# Patient Record
Sex: FEMALE | Race: WHITE | NOT HISPANIC OR LATINO | URBAN - METROPOLITAN AREA
[De-identification: names, ages, dates, MRNs, and addresses within clinical notes are randomized per-mention and may not be internally consistent; named-entity substitution may affect disease eponyms.]

---

## 2023-07-31 ENCOUNTER — EMERGENCY (EMERGENCY)
Facility: HOSPITAL | Age: 28
LOS: 1 days | Discharge: HOME | End: 2023-07-31
Admitting: EMERGENCY MEDICINE
Payer: COMMERCIAL

## 2023-07-31 VITALS
HEIGHT: 69 IN | WEIGHT: 138.01 LBS | SYSTOLIC BLOOD PRESSURE: 124 MMHG | RESPIRATION RATE: 16 BRPM | DIASTOLIC BLOOD PRESSURE: 87 MMHG | TEMPERATURE: 98 F | OXYGEN SATURATION: 98 % | HEART RATE: 79 BPM

## 2023-07-31 PROCEDURE — 99284 EMERGENCY DEPT VISIT MOD MDM: CPT

## 2023-07-31 NOTE — ED PROVIDER NOTE - OBJECTIVE STATEMENT
For patient with a past medical history or allergies presents with increased swelling over bug bite on right calf.  States that she was [place] 2 days ago when she initially got the bite admits to pruritus.  Denies any fever, chills, sweats.  Patient noticed increased swelling yesterday.  Denies any lymphadenopathy.

## 2023-07-31 NOTE — ED ADULT NURSE NOTE - CHPI ED NUR SYMPTOMS NEG
no abdominal pain/not acting differently/no anxiety/no arthralgias/no blurred vision/no body aches/no chest pain/no chills/no crying/no decreased eating/drinking/no diaphoresis/no difficulty breathing/no dizziness/no fever/no fussiness/no inflammation/no itching/no sleeping issues/no rash/no sadness/no swelling of face, tongue/no tingling/no vomiting/no weakness/no wheezing

## 2023-07-31 NOTE — ED ADULT NURSE NOTE - CHIEF COMPLAINT QUOTE
NEPHROLOGY INPATIENT PROGRESS NOTE      Patient: Yancy Benavides Date:   : 1968 Attending: Yony Nieves MD   54 year old male      Chief Complaint:   Chief Complaint   Patient presents with   • Finger Pain      HPI from Initial consult     This is a 54 year old male with a past medical history significant for ESRD on Hemodialysis MWF schedule at [facility]. Patient has LUE AVF for access. He only did partial treatment on  2:45 min. His normal treatment time is 4 hours. EDW is 112 kg. Patient presents to ER with Complaints of LUE finger non healing wounds, has been chronic issue for him and has been going on for a period of months. He co of increased swelling and pain in the fingers on his L hand. He is seeing wound care and has been on abx therapy w/o improvement. He is currently admitted for further treatment of his L finger/hand wounds. Nephrology has been consulted for patient's ESRD and assistance with his hemodialysis while inpatient.      Subjective:   20  underwent balloon angioplasty of the peripheral outflow this morning.  No other events.      Medications     • piperacillin-tazobactam (ZOSYN) 3.375 g in sodium chloride 0.9 % 100 mL IVPB Intravenous 2 times per day   • epoetin yobany-epbx (RETACRIT) 61204 UNIT/ML injection 10,000 Units Subcutaneous Once per day on  Sat   • acetic acid 0.25 % in sterile water irrigation solution Topical BID   • insulin glargine (LANTUS) injection 30 Units Subcutaneous 2 times per day   • insulin lispro (ADMELOG,HumaLOG) - Scheduled Mealtime Dose Subcutaneous 4 times per day   • apixaBAN (ELIQUIS) tablet 2.5 mg PEG Tube 2 times per day   • metoCLOPramide (REGLAN) tablet 2.5 mg PEG Tube TID   • sevelamer carbonate (RENVELA) oral packet 800 mg Per G Tube TID WC   • polyethylene glycol (MIRALAX) packet 17 g PEG Tube Daily   • insulin lispro (ADMELOG,HumaLOG) - Correction Dose Subcutaneous 4 times per day   • sodium chloride (PF) 0.9 %  injection 2 mL Intracatheter 2 times per day   • pantoprazole (PROTONIX) 40 MG/20ML (compounded) suspension 40 mg PEG Tube Nightly   • chlorhexidine gluconate (PERIDEX) 0.12 % solution 15 mL Swish & Spit 2 times per day   • Magnesium Standard Replacement Protocol Does not apply See Admin Instructions        Objective   Vitals  Vitals 12/31/2022 12/31/2022 12/31/2022 12/31/2022 12/31/2022 12/31/2022 12/31/2022   SYSTOLIC 88 84 70 - 71 74 101   DIASTOLIC 36 37 35 - 33 31 46   Pulse 69 69 69 69 69 69 69   Temp - - - - - - 96.3   Resp - 18 18 18 18 18 18   Weight kg - - - - - - -   Height - - - - - - -   BMI (Calculated) - - - - - - -   Some recent data might be hidden     Exam:  General: On vent  HEENT: Head atraumatic, normocephalic.    Chest/Lungs: Trach, on vent. Clear  CVS: Paced. S1,S2 well heard. No rub.  Abdomen: Soft, non distended.    Neuro: opening eyes. Does not follow commands.   Skin: Warm, dry.  No rashes.   Extremities: No LE edema.  LUE AVF +thrill  Stable exam.     Laboratory Results and Imaging   Laboratory Results:  Recent Labs     08/07/22  2204 08/08/22  0308 09/05/22  1818 09/06/22  0402 09/27/22  0403 09/28/22  0347 09/29/22  0331 10/12/22  0405 10/13/22  0340 10/26/22  0657 10/27/22  0422 11/21/22  0427 11/23/22  0833 11/28/22  0626 12/02/22  0914 12/08/22  0802 12/22/22  0542 12/22/22  0938 12/27/22  0338 12/31/22  0336   SODIUM  --    < >  --    < > 139 137   < > 131*   < > 133*   < > 132*   < > 131* 135   < >  --  134* 133* 137   POTASSIUM  --    < >  --    < > 5.0 4.7   < > 5.3*   < > 6.1*   < > 4.7   < > 5.2* 4.5   < >  --  5.0 4.7 4.3   CHLORIDE  --    < >  --    < > 97 97   < > 91*   < > 92*   < > 92*   < > 91* 96*   < >  --  95* 95* 100   CO2  --    < >  --    < > 29 27   < > 28   < > 25   < > 27   < > 26 24   < >  --  27 26 28   ANIONGAP  --    < >  --    < > 18 18   < > 17   < > 22*   < > 18   < > 19 20*   < >  --  17 17 13   BUN  --    < > 38*   < > 56* 76*   < > 117*   < > 126*   < >  124*   < > 158* 107*   < >  --  103* 99* 67*   CREATININE  --    < >  --    < > 3.70* 4.68*   < > 4.33*   < > 4.17*   < > 4.05*   < > 4.50* 2.73*   < >  --  2.96* 3.22* 2.76*   GLUCOSE  --    < >  --    < > 125* 129*   < > 245*   < > 246*   < > 177*   < > 242* 205*   < >  --  155* 162* 91   CALCIUM  --    < >  --    < > 9.5 9.6   < > 10.0   < > 10.1   < > 9.9   < > 9.6 8.9   < >  --  8.8 9.2 9.2   ANGELES 1.11*  --   --   --   --   --   --   --   --   --   --   --   --   --   --   --   --   --   --   --    MG  --    < > 2.1  --  2.2 2.3  --   --   --   --   --   --   --   --   --   --   --   --   --   --    PHOS  --    < >  --    < >  --  6.3*   < > 4.7   < >  --    < > 4.5  --   --   --   --  3.3  --  3.6  --    INTAC  --   --   --   --   --   --   --  118*  --   --   --   --   --   --   --   --   --   --   --  73   VITD25  --   --   --   --   --   --   --   --   --   --   --   --   --   --   --   --   --   --  50.5  --    ALBUMIN  --    < >  --    < > 2.6* 2.7*   < > 2.4*   < > 2.1*  --   --   --  1.7* 1.8*  --   --   --   --   --     < > = values in this interval not displayed.         Recent Labs     10/06/22  1741 10/07/22  0325 11/04/22  0947 11/07/22  0345 11/28/22  0940 12/01/22  0351 12/23/22  1543 12/24/22  0030 12/26/22  0448 12/29/22  0418   WBC  --    < >  --    < >  --    < > 6.4  --  9.1 7.7   HGB  --    < >  --    < >  --    < > 5.5* 9.5* 8.5* 8.0*   HCT  --    < >  --    < >  --    < > 18.6*  --  27.4* 26.4*   PLT  --    < >  --    < >  --    < > 301  --  275 244   FERR 1,081*  --  811*  --  1,768*  --   --   --   --   --    PST 15  --  16  --  22  --   --   --   --   --     < > = values in this interval not displayed.     Urine Panel  Recent Labs     02/21/22 2129   USPG 1.020   UPH 6.5   UPROT 100*   UROB 0.2   UNITR Negative   UKET Trace*   UBILI Negative   UWBC Large*   URBC Moderate*     PTH, Intact   Date Value Ref Range Status   12/31/2022 73 19 - 88 pg/mL Final   10/12/2022 118 (H) 19 - 88  pg/mL Final   09/29/2021 337 (H) 19 - 88 pg/mL Final     Vitamin D, 25-Hydroxy   Date Value Ref Range Status   12/27/2022 50.5 30.0 - 100.0 ng/mL Final     Comment:     <20 ng/mL  =  Vitamin D deficiency  20-29 ng/mL  =  Vitamin D insufficiency   ng/mL  =  Optimal Vitamin D  >150 ng/mL  =  Possible toxicity     Ferritin   Date Value Ref Range Status   11/28/2022 1,768 (H) 26 - 388 ng/mL Final   11/04/2022 811 (H) 26 - 388 ng/mL Final   10/06/2022 1,081 (H) 26 - 388 ng/mL Final     Iron, Percent Saturation   Date Value Ref Range Status   11/28/2022 22 15 - 45 % Final   11/04/2022 16 15 - 45 % Final   10/06/2022 15 15 - 45 % Final     Recent Labs     10/06/22  1741 10/12/22  0405 11/04/22  0947 11/28/22  0940 12/27/22  0338 12/31/22  0336   INTAC  --  118*  --   --   --  73   VITD25  --   --   --   --  50.5  --    FERR 1,081*  --  811* 1,768*  --   --    PST 15  --  16 22  --   --      No results for input(s): TACRO in the last 72 hours.    Imaging Studies: Reviewed.    Assessment      ESRD: On hemodialysis TTS.   ? LUE AVF for access  ? Had fistulagram 9/2 with angioplasty.   ? Prolonged bleeding from AVF site following dialysis session on 12/29/22-   ? S/P another fistulogram with balloon angioplasty of the peripheral outflow vein for recurrent stenosis.12/31/22     Hypertension/Hypotension: BP is stable.  meds reviewed.     Hyperkalemia- Continue Low K bath with HD as above. Most recent level wnl.     Anemia in ESRD:  Received IV iron. on LUCILLE. HGB goal 10.  PRBC as needed.    LUE hand/Finger wounds: Wound care following. Concern for vascular steal syndrome from fistula    Secondary hyperparathyroidism:   ·  on 10/12/22  · Recheck PTH level    Hyperphosphatemia  · Continue binder tid    Coccyx wound- Debridement 10/2, additional debridement 12/8. On antibiotic    Nutrition - TF through PEG tube    S/P Cardiopulmonary arrest, Anoxic encephalopathy: + trach. on Vent      No LTAC facilities that will  accept pt on trach, vent, and dialysis. Denies in [place]. Will be kept in-house now.     Plan for HD today.  Results of fistulogram as detailed above.  Appreciate the help of Radiology Department    Estimated Dry Weight (kg) (Value cannot be 0 kg) Specify (kg)   Estimated Dry Weight (kg) (cannot be 0 kg) 103   Type of Therapy Hemodialysis   Access to be utilized Fistula/graft   Special Instructions 15 gauge   Blood Flow 400 - 500 mL/min   Titrate to pressures (arterial and venous) for maximum flow rate   Dialysate Potassium 2   Dialysate Calcium 2.5   Dialysate Sodium 138   Dialysate Bicarbonate 30   Dialysate Flow Rate 800 mL/min   Dialysate Temperature 36 degrees Celsius   Hemodialysis Duration (hours) 3.75   Hemodialysis Weight Loss (kg as tolerated) 2 - 3   Ultrafiltration Profile No   Maintain Pressures SBP   Maintain SBP greater than (mmHg) 90   Dialyzer ([place]) F180   Heparin Ordered No     Ivana Erickson   Transplant Nephrologist, [facility]          Pt. walked in for bug bite to R calf area which happened 2 days ago and since the area around the bit is very red, warm to touch and growing.

## 2023-07-31 NOTE — ED PROVIDER NOTE - CLINICAL SUMMARY MEDICAL DECISION MAKING FREE TEXT BOX
Patient presents with right calf pain and swelling from an active bug bite.  Denies any fever, chills, sweats, lymphadenopathy.  On exam patient is well-appearing, neurovascularly intact.  Erythematous plaque without any induration or fluctuance noted over the right calf consistent with an infected bug bite.  Positive tender right inguinal node.  Will prescribe doxycycline. first dose given in ED all precautions reviewed.

## 2023-07-31 NOTE — ED PROVIDER NOTE - PATIENT PORTAL LINK FT
You can access the FollowMyHealth Patient Portal offered by [facility] [place] by registering at the following website: [email or web address]. By joining [facility]’s FollowMyHealth portal, you will also be able to view your health information using other applications (apps) compatible with our system.

## 2023-07-31 NOTE — ED ADULT TRIAGE NOTE - CHIEF COMPLAINT QUOTE
Pt. walked in for bug bite to R calf area which happened 2 days ago and since the area around the bit is very red, warm to touch and growing.

## 2023-07-31 NOTE — ED ADULT NURSE NOTE - OBJECTIVE STATEMENT
Pt with bug bite to calf with surrounding erythema, pt outlined affected area and it has spread to beyond the line and also beginning with streaking. No fevers, no n/v, no chills, no drainage.

## 2023-08-03 DIAGNOSIS — S80.861A INSECT BITE (NONVENOMOUS), RIGHT LOWER LEG, INITIAL ENCOUNTER: ICD-10-CM
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
## 2023-08-03 DIAGNOSIS — L03.115 CELLULITIS OF RIGHT LOWER LIMB: ICD-10-CM
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
